# Patient Record
Sex: FEMALE | Race: WHITE
[De-identification: names, ages, dates, MRNs, and addresses within clinical notes are randomized per-mention and may not be internally consistent; named-entity substitution may affect disease eponyms.]

---

## 2018-03-27 ENCOUNTER — HOSPITAL ENCOUNTER (OUTPATIENT)
Dept: HOSPITAL 63 - SURG | Age: 83
Discharge: HOME | End: 2018-03-27
Attending: ANESTHESIOLOGY
Payer: MEDICARE

## 2018-03-27 DIAGNOSIS — M79.1: Primary | ICD-10-CM

## 2018-03-27 DIAGNOSIS — Z90.710: ICD-10-CM

## 2018-03-27 DIAGNOSIS — K21.9: ICD-10-CM

## 2018-03-27 DIAGNOSIS — M19.90: ICD-10-CM

## 2018-03-27 DIAGNOSIS — Z79.4: ICD-10-CM

## 2018-03-27 DIAGNOSIS — Z96.649: ICD-10-CM

## 2018-03-27 DIAGNOSIS — I10: ICD-10-CM

## 2018-03-27 DIAGNOSIS — Z90.49: ICD-10-CM

## 2018-03-27 DIAGNOSIS — I48.91: ICD-10-CM

## 2018-03-27 DIAGNOSIS — Z79.899: ICD-10-CM

## 2018-03-27 DIAGNOSIS — F32.9: ICD-10-CM

## 2018-03-27 DIAGNOSIS — E11.9: ICD-10-CM

## 2018-03-27 PROCEDURE — 20553 NJX 1/MLT TRIGGER POINTS 3/>: CPT

## 2018-04-16 ENCOUNTER — HOSPITAL ENCOUNTER (OUTPATIENT)
Dept: HOSPITAL 61 - KCIC MRI | Age: 83
Discharge: HOME | End: 2018-04-16
Payer: MEDICARE

## 2018-04-16 DIAGNOSIS — M48.54XD: Primary | ICD-10-CM

## 2018-04-16 DIAGNOSIS — M47.894: ICD-10-CM

## 2018-04-16 DIAGNOSIS — M50.322: ICD-10-CM

## 2018-04-16 PROCEDURE — 72146 MRI CHEST SPINE W/O DYE: CPT

## 2020-04-13 ENCOUNTER — HOSPITAL ENCOUNTER (EMERGENCY)
Dept: HOSPITAL 63 - ER | Age: 85
LOS: 1 days | Discharge: ANOTHER HOSPITAL | End: 2020-04-14
Payer: MEDICARE

## 2020-04-13 VITALS — BODY MASS INDEX: 36.35 KG/M2 | WEIGHT: 226.19 LBS | HEIGHT: 66 IN

## 2020-04-13 VITALS — DIASTOLIC BLOOD PRESSURE: 63 MMHG | SYSTOLIC BLOOD PRESSURE: 152 MMHG

## 2020-04-13 DIAGNOSIS — K59.00: ICD-10-CM

## 2020-04-13 DIAGNOSIS — R07.89: Primary | ICD-10-CM

## 2020-04-13 DIAGNOSIS — I48.91: ICD-10-CM
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
## 2020-04-13 DIAGNOSIS — I11.0: ICD-10-CM

## 2020-04-13 DIAGNOSIS — E46: ICD-10-CM

## 2020-04-13 DIAGNOSIS — E87.1: ICD-10-CM

## 2020-04-13 DIAGNOSIS — Z20.828: ICD-10-CM

## 2020-04-13 DIAGNOSIS — E87.5: ICD-10-CM

## 2020-04-13 DIAGNOSIS — D72.829: ICD-10-CM

## 2020-04-13 DIAGNOSIS — I50.9: ICD-10-CM

## 2020-04-13 DIAGNOSIS — R10.12: ICD-10-CM

## 2020-04-13 DIAGNOSIS — J18.1: ICD-10-CM

## 2020-04-13 DIAGNOSIS — E11.9: ICD-10-CM

## 2020-04-13 DIAGNOSIS — M19.90: ICD-10-CM

## 2020-04-13 DIAGNOSIS — R79.89: ICD-10-CM

## 2020-04-13 DIAGNOSIS — D50.9: ICD-10-CM

## 2020-04-13 DIAGNOSIS — F10.20: ICD-10-CM

## 2020-04-13 DIAGNOSIS — N19: ICD-10-CM

## 2020-04-13 LAB
% LYMPHS: 5 % (ref 24–48)
% MONOS: 5 % (ref 0–10)
% SEGS: 90 % (ref 35–66)
ALBUMIN SERPL-MCNC: 2.9 G/DL (ref 3.4–5)
ALP SERPL-CCNC: 221 U/L (ref 46–116)
ALT SERPL-CCNC: 30 U/L (ref 14–59)
AMPHETAMINE/METHAMPHETAMINE: (no result)
ANION GAP SERPL CALC-SCNC: 7 MMOL/L (ref 6–14)
APTT PPP: YELLOW S
AST SERPL-CCNC: 48 U/L (ref 15–37)
BACTERIA #/AREA URNS HPF: 0 /HPF
BARBITURATES UR-MCNC: (no result) UG/ML
BASOPHILS # BLD AUTO: 0 X10^3/UL (ref 0–0.2)
BASOPHILS NFR BLD: 0 % (ref 0–3)
BENZODIAZ UR-MCNC: (no result) UG/L
BGAS PCO2: 41 MMHG (ref 35–45)
BGAS PH: 7.41 (ref 7.35–7.45)
BGAS PO2: 62 MMHG (ref 71–100)
BILIRUB DIRECT SERPL-MCNC: 0.3 MG/DL (ref 0–0.2)
BILIRUB SERPL-MCNC: 0.7 MG/DL (ref 0.2–1)
BILIRUB UR QL STRIP: (no result)
CA-I SERPL ISE-MCNC: 55 MG/DL (ref 7–20)
CALCIUM SERPL-MCNC: 8.7 MG/DL (ref 8.5–10.1)
CANNABINOIDS UR-MCNC: (no result) UG/L
CHLORIDE SERPL-SCNC: 81 MMOL/L (ref 98–107)
CO2 SERPL-SCNC: 29 MMOL/L (ref 21–32)
COCAINE UR-MCNC: (no result) NG/ML
CREAT SERPL-MCNC: 1.4 MG/DL (ref 0.6–1)
DELTA BASE BGAS: 1 MMOL/L (ref 0–3)
EOSINOPHIL NFR BLD: 0 % (ref 0–3)
EOSINOPHIL NFR BLD: 0 X10^3/UL (ref 0–0.7)
ERYTHROCYTE [DISTWIDTH] IN BLOOD BY AUTOMATED COUNT: 17 % (ref 11.5–14.5)
FIBRINOGEN PPP-MCNC: CLEAR MG/DL
GFR SERPLBLD BASED ON 1.73 SQ M-ARVRAT: 35.8 ML/MIN
GLUCOSE SERPL-MCNC: 154 MG/DL (ref 70–99)
GLUCOSE UR STRIP-MCNC: (no result) MG/DL
HCT VFR BLD CALC: 34 % (ref 36–47)
HGB BLD-MCNC: 10.8 G/DL (ref 12–15.5)
INFLUENZA A PATIENT: NEGATIVE
INFLUENZA B PATIENT: NEGATIVE
LIPASE: 76 U/L (ref 73–393)
LYMPHOCYTES # BLD: 0.9 X10^3/UL (ref 1–4.8)
LYMPHOCYTES NFR BLD AUTO: 6 % (ref 24–48)
MAGNESIUM SERPL-MCNC: 1.8 MG/DL (ref 1.8–2.4)
MCH RBC QN AUTO: 24 PG (ref 25–35)
MCHC RBC AUTO-ENTMCNC: 32 G/DL (ref 31–37)
MCV RBC AUTO: 74 FL (ref 79–100)
METHADONE SERPL-MCNC: (no result) NG/ML
MONO #: 0.9 X10^3/UL (ref 0–1.1)
MONOCYTES NFR BLD: 6 % (ref 0–9)
NEUT #: 13.1 X10^3UL (ref 1.8–7.7)
NEUTROPHILS NFR BLD AUTO: 88 % (ref 31–73)
NITRITE UR QL STRIP: (no result)
O2 SAT BGAS: 92 % (ref 92–99)
O2/TOTAL GAS SETTING VFR VENT: 28 %
OPIATES UR-MCNC: (no result) NG/ML
PCP SERPL-MCNC: (no result) MG/DL
PLATELET # BLD AUTO: 460 X10^3/UL (ref 140–400)
PLATELET # BLD EST: (no result) 10*3/UL
POTASSIUM SERPL-SCNC: 5.9 MMOL/L (ref 3.5–5.1)
PROT SERPL-MCNC: 7 G/DL (ref 6.4–8.2)
RBC # BLD AUTO: 4.57 X10^6/UL (ref 3.5–5.4)
RBC #/AREA URNS HPF: (no result) /HPF (ref 0–2)
SODIUM SERPL-SCNC: 117 MMOL/L (ref 136–145)
SP GR UR STRIP: 1.01
SQUAMOUS #/AREA URNS LPF: (no result) /LPF
UROBILINOGEN UR-MCNC: 0.2 MG/DL
WBC # BLD AUTO: 14.8 X10^3/UL (ref 4–11)
WBC #/AREA URNS HPF: 0 /HPF (ref 0–4)

## 2020-04-13 PROCEDURE — 87205 SMEAR GRAM STAIN: CPT

## 2020-04-13 PROCEDURE — 85610 PROTHROMBIN TIME: CPT

## 2020-04-13 PROCEDURE — 83605 ASSAY OF LACTIC ACID: CPT

## 2020-04-13 PROCEDURE — 71045 X-RAY EXAM CHEST 1 VIEW: CPT

## 2020-04-13 PROCEDURE — 87077 CULTURE AEROBIC IDENTIFY: CPT

## 2020-04-13 PROCEDURE — 81001 URINALYSIS AUTO W/SCOPE: CPT

## 2020-04-13 PROCEDURE — 87040 BLOOD CULTURE FOR BACTERIA: CPT

## 2020-04-13 PROCEDURE — 96366 THER/PROPH/DIAG IV INF ADDON: CPT

## 2020-04-13 PROCEDURE — 87880 STREP A ASSAY W/OPTIC: CPT

## 2020-04-13 PROCEDURE — 83880 ASSAY OF NATRIURETIC PEPTIDE: CPT

## 2020-04-13 PROCEDURE — 74018 RADEX ABDOMEN 1 VIEW: CPT

## 2020-04-13 PROCEDURE — 99285 EMERGENCY DEPT VISIT HI MDM: CPT

## 2020-04-13 PROCEDURE — 96375 TX/PRO/DX INJ NEW DRUG ADDON: CPT

## 2020-04-13 PROCEDURE — 36600 WITHDRAWAL OF ARTERIAL BLOOD: CPT

## 2020-04-13 PROCEDURE — 36415 COLL VENOUS BLD VENIPUNCTURE: CPT

## 2020-04-13 PROCEDURE — 96365 THER/PROPH/DIAG IV INF INIT: CPT

## 2020-04-13 PROCEDURE — P9612 CATHETERIZE FOR URINE SPEC: HCPCS

## 2020-04-13 PROCEDURE — 82803 BLOOD GASES ANY COMBINATION: CPT

## 2020-04-13 PROCEDURE — 84484 ASSAY OF TROPONIN QUANT: CPT

## 2020-04-13 PROCEDURE — 84443 ASSAY THYROID STIM HORMONE: CPT

## 2020-04-13 PROCEDURE — 71250 CT THORAX DX C-: CPT

## 2020-04-13 PROCEDURE — 86803 HEPATITIS C AB TEST: CPT

## 2020-04-13 PROCEDURE — 82150 ASSAY OF AMYLASE: CPT

## 2020-04-13 PROCEDURE — 87340 HEPATITIS B SURFACE AG IA: CPT

## 2020-04-13 PROCEDURE — 96368 THER/DIAG CONCURRENT INF: CPT

## 2020-04-13 PROCEDURE — 83735 ASSAY OF MAGNESIUM: CPT

## 2020-04-13 PROCEDURE — 82553 CREATINE MB FRACTION: CPT

## 2020-04-13 PROCEDURE — 85730 THROMBOPLASTIN TIME PARTIAL: CPT

## 2020-04-13 PROCEDURE — 93005 ELECTROCARDIOGRAM TRACING: CPT

## 2020-04-13 PROCEDURE — 96372 THER/PROPH/DIAG INJ SC/IM: CPT

## 2020-04-13 PROCEDURE — 86705 HEP B CORE ANTIBODY IGM: CPT

## 2020-04-13 PROCEDURE — 86709 HEPATITIS A IGM ANTIBODY: CPT

## 2020-04-13 PROCEDURE — 85007 BL SMEAR W/DIFF WBC COUNT: CPT

## 2020-04-13 PROCEDURE — 80307 DRUG TEST PRSMV CHEM ANLYZR: CPT

## 2020-04-13 PROCEDURE — 80076 HEPATIC FUNCTION PANEL: CPT

## 2020-04-13 PROCEDURE — 87070 CULTURE OTHR SPECIMN AEROBIC: CPT

## 2020-04-13 PROCEDURE — 85025 COMPLETE CBC W/AUTO DIFF WBC: CPT

## 2020-04-13 PROCEDURE — 83690 ASSAY OF LIPASE: CPT

## 2020-04-13 PROCEDURE — 87804 INFLUENZA ASSAY W/OPTIC: CPT

## 2020-04-13 PROCEDURE — 85379 FIBRIN DEGRADATION QUANT: CPT

## 2020-04-13 PROCEDURE — 87635 SARS-COV-2 COVID-19 AMP PRB: CPT

## 2020-04-13 PROCEDURE — 74176 CT ABD & PELVIS W/O CONTRAST: CPT

## 2020-04-13 PROCEDURE — 80048 BASIC METABOLIC PNL TOTAL CA: CPT

## 2020-04-13 NOTE — RAD
Exam: Chest one view

 

INDICATION: Weakness

 

TECHNIQUE: Frontal view of the chest

 

Comparisons: None

 

FINDINGS:

The cardiomediastinal silhouette and pulmonary vessels are within normal 

limits.

 

Hazy opacities at the lung bases bilaterally greater on the left with 

small bilateral pleural effusions. Additionally there is a hazy opacity at

the right upper lung.

 

IMPRESSION:

Scattered areas of hazy opacity particularly at the right upper lung and 

left lung base with small bilateral pleural effusions. Findings may relate

to multifocal pneumonia. Follow-up imaging posttreatment to ensure 

resolution is recommended.

 

Electronically signed by: Sunitha Ferguson MD (4/13/2020 8:41 PM) [id number]

## 2020-04-13 NOTE — RAD
Exam: Abdomen one view

 

INDICATION: Abdomen pain

 

TECHNIQUE: Supine view the abdomen

 

Comparisons: None

 

FINDINGS:

Air and stool are noted throughout the colon to level the rectum in a 

nonobstructive bowel gas pattern.

 

Surgical clips from prior cholecystectomy and IVC filter noted.

 

No suspicious masses or calcifications.

 

Visualized osseous structures are unremarkable.

 

IMPRESSION:

Nonobstructive bowel gas pattern.

 

Electronically signed by: Sunitha Ferguson MD (4/13/2020 8:43 PM) [id number]

## 2020-04-13 NOTE — EKG
[facility] [address], [place] [address]

Test Date:    2020               Test Time:    20:13:29

Pat Name:     KRISHAN CAPELLAN            Department:   

Patient ID:   [id number]           Room:          

Gender:       F                        Technician:   

:          1935               Requested By: DAYANA HARDING

Order Number: [id number]            Reading MD:   Timmy Young

                                 Measurements

Intervals                              Axis          

Rate:         70                       P:            

MO:                                    QRS:          73

QRSD:         90                       T:            34

QT:           388                                    

QTc:          422                                    

                           Interpretive Statements

ATRIAL FIBRILLATION.

QRS(T) CONTOUR ABNORMALITY

CONSISTENT WITH ANTEROSEPTAL INFARCT

PROBABLY OLD



Electronically Signed On 2020 11:19:13 CDT by Timmy Young

## 2020-04-13 NOTE — PHYS DOC
Past History


Past Medical History:  A-Fib, Alcoholism, Arthritis, Constipation, Dementia, 

Depression, Hypertension, Pneumonia, Other


Past Surgical History:  Other





General Adult


EDM:


Chief Complaint:  ABDOMINAL PAIN





HPI:


HPI:


".. I hurt all... over ....my tummy....  I feel  ... full...help.. help me... my

god... my bowels.. I feel like.. I got to go.... bathroom ... bad..."








Patient is a 84 year old female who presents with above hx and complaints of  

increase edema, wt. gain , weakness, generalized abd. pain and fatigue.  Patient

a transfer to ED for evaluation from [facility] and rehab.  Patient has 

been a resident there is since 12/10/2019.  Patient follows with  at  

[facility].  Patient has history of lateral malleolus fracture on righ, RSV 

pneumonia, chronic A. fib, diabetes with peripheral neuropathy, cognitive 

communication deficit, dysphasia, dementia, and deconditioning.  Patient 

reportedly has recent change in meds-Lasix was stopped.  Nursing home report 

advised patient was very constipated and been drinking excessive amount of 

alcohol?





Review of Systems:


Review of Systems:


Review of systems somewhat limited because of patient's mental state-and 

dementia


Constitutional:  Denies fever or chills 


Eyes:  Denies change in visual acuity 


HENT:  Denies nasal congestion or sore throat 


Respiratory: Complains of left lower chest discomfort and  shortness of breath 


Cardiovascular:  Denies chest pain or edema 


GI: Complains of abdominal pain, nausea,.  Denies vomiting, bloody stools or 

diarrhea patient localizes pain in abdomen left upper and mid quadrant


:  Denies dysuria 


Musculoskeletal:  Denies back pain or joint pain 


Integument:  Denies rash 


Neurologic:  Denies headache, focal weakness or sensory changes 


Endocrine:  Denies polyuria or polydipsia 


Lymphatic:  Denies swollen glands 


Psychiatric:  Denies depression or anxiety





Heart Score:


HEART Score for Chest Pain:  








HEART Score for Chest Pain Response (Comments) Value


 


History Moderately Suspicious 1


 


ECG Nonspecific Repolarizatio 1


 


Age > 65 2


 


Risk Factors >3 Risk Factors or Hx CAD 2


 


Troponin < Normal Limit 0


 


Total  6








Risk Factors:


Risk Factors:  DM, Current or recent (<one month) smoker, HTN, HLP, family 

history of CAD, obesity.


Risk Scores:


Score 0 - 3:  2.5% MACE over next 6 weeks - Discharge Home


Score 4 - 6:  20.3% MACE over next 6 weeks - Admit for Clinical Observation


Score 7 - 10:  72.7% MACE over next 6 weeks - Early Invasive Strategies





Family History:


Family History:


Not currently available





Current Medications:


Current Meds:


See nursing for nursing home meds





Allergies:


Allergies:


Reported allergy to cephalexin





Physical Exam:


PE:





Constitutional: Reports acute distress, chronically ill and appearance. []


HENT: Normocephalic, atraumatic, bilateral external ears normal, oropharynx dry,

 no oral exudates, nose normal. []


Eyes: PERRLA, EOMI, conjunctiva pale


Neck: Trachea midline


Cardiovascular: Irregular heart rate and rhythm, no murmur [] PMI to the left


Lungs & Thorax:  Bilateral breath sounds equal apex with scattered wheezes and 

basilar crackles on auscultation []


Abdomen: Bowel sounds are hyper active , soft, generalized tenderness, no 

masses, no pulsatile masses. [] Old surgery scar.  No stool in vault.  No gross 

blood.  Some localization to left upper quadrant and mid abdomen on rebound


Skin: Warm, dry, no erythema, no rash.  Poor turgor


Back: No tenderness, no CVA tenderness. [] 


Extremities: Complains of generalized limb tenderness, moves extremities with 

noxious stimuli ,leg edema.  No obvious


Neurologic: Alert and oriented X 2, moves extremities on request, has distal 

sensory,, confused


Psychologic: Affect anxious and agitated, judgement obviously impaired l, mood 

depressed





EKG:


EKG:


My interpretation EKG shows a irregular  rhythm and rate.  Consistent with A. 

fib.  Does have some nonspecific contour abnormalities and anterior septal 

changes.  But no findings of acute STEMI of contralateral changes.  []





Radiology/Procedures:


Radiology/Procedures:


[facility] [address], [place] [address] [phone]


                                        


                                 IMAGING REPORT





                                     Signed





PATIENT: KRISHAN CAPELLAN  ACCOUNT: [id number]     MRN#: [id number]


: 1935           LOCATION: ER              AGE: 84


SEX: F                    EXAM DT: 20         ACCESSION#: [id number]


STATUS: REG ER            ORD. PHYSICIAN: DAYANA HARDING MD


REASON: WEAKNESS


PROCEDURE: PORTABLE CHEST 1V





Exam: Chest one view


 


INDICATION: Weakness


 


TECHNIQUE: Frontal view of the chest


 


Comparisons: None


 


FINDINGS:


The cardiomediastinal silhouette and pulmonary vessels are within normal 


limits.


 


Hazy opacities at the lung bases bilaterally greater on the left with 


small bilateral pleural effusions. Additionally there is a hazy opacity at


the right upper lung.


 


IMPRESSION:


Scattered areas of hazy opacity particularly at the right upper lung and 


left lung base with small bilateral pleural effusions. Findings may relate


to multifocal pneumonia. Follow-up imaging posttreatment to ensure 


resolution is recommended.


 


Electronically signed by: Sunitha Spencer MD (2020 8:41 PM) [id number]














DICTATED AND SIGNED BY:     SUNITHA SPENCER MD


DATE:     20





CC: DAYANA HARDING MD; NICOLE SANCHEZ MD ~


[facility] [address], [place] [address] [phone]


                                        


                                 IMAGING REPORT





                                     Signed





PATIENT: KRISHAN CAPELLAN  ACCOUNT: [id number]     MRN#: [id number]


: 1935           LOCATION: ER              AGE: 84


SEX: F                    EXAM DT: 20         ACCESSION#: [id number]


STATUS: REG ER            ORD. PHYSICIAN: DAYANA HARDING MD


REASON: ABDOMEN PAIN.


PROCEDURE: KUB





Exam: Abdomen one view


 


INDICATION: Abdomen pain


 


TECHNIQUE: Supine view the abdomen


 


Comparisons: None


 


FINDINGS:


Air and stool are noted throughout the colon to level the rectum in a 


nonobstructive bowel gas pattern.


 


Surgical clips from prior cholecystectomy and IVC filter noted.


 


No suspicious masses or calcifications.


 


Visualized osseous structures are unremarkable.


 


IMPRESSION:


Nonobstructive bowel gas pattern.


 


Electronically signed by: Sunitha Spencer MD (2020 8:43 PM) [id number]














DICTATED AND SIGNED BY:     SUNITHA SPENCER MD


DATE:     20





CC: DAYANA HARDING MD; NICOLE SANCHEZ MD ~


[][facility] [address], [place] [address] [phone]


                                        


                                 IMAGING REPORT





                                     Signed





PATIENT: KRISHAN CAPELLAN  ACCOUNT: [id number]     MRN#: [id number]


: 1935           LOCATION: ER              AGE: 84


SEX: F                    EXAM DT: 20         ACCESSION#: [id number]


STATUS: REG ER            ORD. PHYSICIAN: DAYANA HARDING MD


REASON: cough, dyspnea, infiltrates, pain Lt. lower chest and upper abd. 


PROCEDURE: CT CHEST ABDOMEN PELVIS WO





EXAM: CT Chest, Abdomen and Pelvis without IV contrast


 


CLINICAL HISTORY: Cough, dyspnea, infiltrates, lower chest and abdominal 


pain. 


 


COMPARISON: MRI thoracic spine 2018.


 


TECHNIQUE: Helical CT of the chest, abdomen and pelvis was performed 


without intravenous contrast. Axial, coronal and sagittal reformatted 


images were generated.


 


---PQRS compliance statement - One or more of the following individualized


dose reduction techniques were utilized for this study:


1.  Automated exposure control


2.  Adjustment of the mA and/or kV according to patient size


3.  Use of iterative reconstruction technique---


 


FINDINGS: 


Lack of intravenous contrast limits evaluation of solid organs, 


vasculature, and lymph nodes. 


 


Chest: 


Marked enlargement of the thyroid particularly the right thyroid and 


isthmus measuring 7 x 5.6 cm resulting in leftward deviation of the 


trachea.


 


No axillary lymphadenopathy. Pretracheal lymph node is enlarged measuring 


1.3 x 1.3 cm. Given noncontrast exam, evaluation for or hilar 


lymphadenopathy is limited.


 


Dense aortic calcifications are seen. Heart is mildly enlarged. Coronary 


calcifications are seen. Aortic root calcifications are noted.


 


Bilateral pleural effusions. Dependent opacities in the lower lobes 


bilaterally may represent atelectasis or developing consolidation given 


air bronchograms favor consolidative process. Right upper lung nodular 


opacity measures 3.7 x 2.1 cm.


 


Abdomen and Pelvis: 


No focal liver lesion. Cholecystectomy clips are seen. No biliary ductal 


dilatation. Calcified granuloma are seen within the spleen. Adrenal glands


are normal. Fatty atrophy of the pancreas.


 


Dense aortic and main branch atherosclerotic calcifications. IVC filter is


seen. No focal renal lesion. No hydronephrosis or hydroureter. Bladder is 


markedly distended, can be correlated with possible voluntary or 


involuntary causes of urinary retention.


 


Moderate colonic stool content is seen. Colonic diverticula. No evidence 


for acute diverticulitis. No bowel obstruction. Rectus diastases is seen 


anteriorly. Associated soft tissue swelling anteriorly and in the flanks 


bilaterally.


 


Bones: 


Left total hip arthroplasty. Severe right hip joint osteophytes arthritis.


Degenerative changes of spine are seen. There is height loss of the T12 


vertebral body is stable.


 


IMPRESSION:


1.  Bilateral pleural effusions.


2.  Multifocal parenchymal airspace opacities may represent consolidative 


process such as pneumonia. 


3.  Nodular opacity in the upper right lung may be consolidative in nature


however underlying mass is also a primary consideration particularly given


the mediastinal lymphadenopathy. Consider short interval follow-up CT 


following resolution of the acute process.


4.  Marked thyroid enlargement with leftward deviation of the trachea. 


Further evaluation with ultrasound is recommended to assess for underlying


mass.


5.  Bladder is markedly distended, can be correlated with possible 


voluntary or involuntary causes of urinary retention.


 


Electronically signed by: Jonas Gamboa MD (2020 11:58 PM) [facility]














DICTATED AND SIGNED BY:     JONAS GAMBOA MD


DATE:     20 9423





CC: DAYANA HARDING MD; NICOLE SANCHEZ MD ~





Course & Med Decision Making:


Course & Med Decision Making


Pertinent Labs and Imaging studies reviewed. (See chart for details)





Discussed presentation, testing and tx. plan with Dr. Santiago-  Plan transfer to 

[facility]








Impression:





1. Chest Pain- Lt. Lower


2. Abdomen Pain - Lt. upper abd.


3. Leukocytosis- 14.8


4. Anemia  Hgb- 10.8, Microcytic 74 and Hypochromic 24,  Thrombocytosis  460


5. Hyponatremia 117 and Hyperkalemic 5.9


6. Renal Failure  -Elevated BUN 55, Creat 1.4


7. DM  154


8. Elevated AST 48,Alk Phos.221


9. BNP 3,933 - Diastolic Dysfunction - CHF


10.Malnutrition  2.9 Alb.


11.Atypical Pneumonia   RUL


12. Hx. Chronic Episodes of Constipation








[]





Dragon Disclaimer:


Dragon Disclaimer:


This electronic medical record was generated, in whole or in part, using a voice

 recognition dictation system.





Departure


Departure:


Disposition:  01 HOME/RESIDENCE PRIOR TO ADM


Condition:  STABLE


Referrals:  


NICOLE SANCHEZ MD (PCP)





Dragon Disclaimer


This chart was dictated in whole or in part using Voice Recognition software in 

a busy, high-work load, and often noisy Emergency Department environment.  It 

may contain unintended and wholly unrecognized errors or omissions.











DAYANA HARDING MD           2020 19:57

## 2020-04-14 NOTE — RAD
EXAM: CT Chest, Abdomen and Pelvis without IV contrast

 

CLINICAL HISTORY: Cough, dyspnea, infiltrates, lower chest and abdominal 

pain. 

 

COMPARISON: MRI thoracic spine 4/16/2018.

 

TECHNIQUE: Helical CT of the chest, abdomen and pelvis was performed 

without intravenous contrast. Axial, coronal and sagittal reformatted 

images were generated.

 

---PQRS compliance statement - One or more of the following individualized

dose reduction techniques were utilized for this study:

1.  Automated exposure control

2.  Adjustment of the mA and/or kV according to patient size

3.  Use of iterative reconstruction technique---

 

FINDINGS: 

Lack of intravenous contrast limits evaluation of solid organs, 

vasculature, and lymph nodes. 

 

Chest: 

Marked enlargement of the thyroid particularly the right thyroid and 

isthmus measuring 7 x 5.6 cm resulting in leftward deviation of the 

trachea.

 

No axillary lymphadenopathy. Pretracheal lymph node is enlarged measuring 

1.3 x 1.3 cm. Given noncontrast exam, evaluation for or hilar 

lymphadenopathy is limited.

 

Dense aortic calcifications are seen. Heart is mildly enlarged. Coronary 

calcifications are seen. Aortic root calcifications are noted.

 

Bilateral pleural effusions. Dependent opacities in the lower lobes 

bilaterally may represent atelectasis or developing consolidation given 

air bronchograms favor consolidative process. Right upper lung nodular 

opacity measures 3.7 x 2.1 cm.

 

Abdomen and Pelvis: 

No focal liver lesion. Cholecystectomy clips are seen. No biliary ductal 

dilatation. Calcified granuloma are seen within the spleen. Adrenal glands

are normal. Fatty atrophy of the pancreas.

 

Dense aortic and main branch atherosclerotic calcifications. IVC filter is

seen. No focal renal lesion. No hydronephrosis or hydroureter. Bladder is 

markedly distended, can be correlated with possible voluntary or 

involuntary causes of urinary retention.

 

Moderate colonic stool content is seen. Colonic diverticula. No evidence 

for acute diverticulitis. No bowel obstruction. Rectus diastases is seen 

anteriorly. Associated soft tissue swelling anteriorly and in the flanks 

bilaterally.

 

Bones: 

Left total hip arthroplasty. Severe right hip joint osteophytes arthritis.

Degenerative changes of spine are seen. There is height loss of the T12 

vertebral body is stable.

 

IMPRESSION:

1.  Bilateral pleural effusions.

2.  Multifocal parenchymal airspace opacities may represent consolidative 

process such as pneumonia. 

3.  Nodular opacity in the upper right lung may be consolidative in nature

however underlying mass is also a primary consideration particularly given

the mediastinal lymphadenopathy. Consider short interval follow-up CT 

following resolution of the acute process.

4.  Marked thyroid enlargement with leftward deviation of the trachea. 

Further evaluation with ultrasound is recommended to assess for underlying

mass.

5.  Bladder is markedly distended, can be correlated with possible 

voluntary or involuntary causes of urinary retention.

 

Electronically signed by: Jonas De La Garza MD (4/13/2020 11:58 PM) [email or web address]